# Patient Record
Sex: FEMALE | Race: WHITE | NOT HISPANIC OR LATINO | Employment: UNEMPLOYED | URBAN - METROPOLITAN AREA
[De-identification: names, ages, dates, MRNs, and addresses within clinical notes are randomized per-mention and may not be internally consistent; named-entity substitution may affect disease eponyms.]

---

## 2017-07-05 ENCOUNTER — OFFICE VISIT (OUTPATIENT)
Dept: URGENT CARE | Facility: CLINIC | Age: 37
End: 2017-07-05
Payer: COMMERCIAL

## 2017-07-05 VITALS
HEIGHT: 67 IN | HEART RATE: 80 BPM | DIASTOLIC BLOOD PRESSURE: 70 MMHG | RESPIRATION RATE: 20 BRPM | WEIGHT: 144.2 LBS | BODY MASS INDEX: 22.63 KG/M2 | OXYGEN SATURATION: 100 % | TEMPERATURE: 98.4 F | SYSTOLIC BLOOD PRESSURE: 102 MMHG

## 2017-07-05 DIAGNOSIS — J40 BRONCHITIS: ICD-10-CM

## 2017-07-05 PROCEDURE — 99203 OFFICE O/P NEW LOW 30 MIN: CPT | Performed by: PHYSICIAN ASSISTANT

## 2017-07-05 RX ORDER — PROMETHAZINE HYDROCHLORIDE AND CODEINE PHOSPHATE 6.25; 1 MG/5ML; MG/5ML
5-10 SYRUP ORAL 3 TIMES DAILY PRN
Qty: 150 ML | Refills: 0 | Status: SHIPPED | OUTPATIENT
Start: 2017-07-05 | End: 2017-07-20 | Stop reason: SDUPTHER

## 2017-07-05 RX ORDER — DOXYCYCLINE HYCLATE 100 MG
100 TABLET ORAL 2 TIMES DAILY
Qty: 20 TAB | Refills: 0 | Status: SHIPPED | OUTPATIENT
Start: 2017-07-05 | End: 2017-07-15

## 2017-07-05 RX ORDER — ALBUTEROL SULFATE 90 UG/1
2 AEROSOL, METERED RESPIRATORY (INHALATION) EVERY 4 HOURS PRN
Qty: 1 INHALER | Refills: 0 | Status: SHIPPED | OUTPATIENT
Start: 2017-07-05 | End: 2017-07-05

## 2017-07-05 RX ORDER — ALBUTEROL SULFATE 90 UG/1
2 AEROSOL, METERED RESPIRATORY (INHALATION) EVERY 4 HOURS PRN
Qty: 1 INHALER | Refills: 0 | Status: SHIPPED | OUTPATIENT
Start: 2017-07-05 | End: 2018-04-20

## 2017-07-05 ASSESSMENT — ENCOUNTER SYMPTOMS
CONSTITUTIONAL NEGATIVE: 1
SHORTNESS OF BREATH: 0
FEVER: 0
SORE THROAT: 0
CARDIOVASCULAR NEGATIVE: 1
COUGH: 1
EYES NEGATIVE: 1
SPUTUM PRODUCTION: 1

## 2017-07-05 NOTE — MR AVS SNAPSHOT
"        Leslee Mcnair   2017 5:45 PM   Office Visit   MRN: [id number]    Department:  [facility]   Dept Phone:  [phone]    Description:  Female : 1980   Provider:  Harley Flores PA-C           Reason for Visit     Cough cough, chest congestion, sinus infection, green thick phlegm, headache, nose, thraot, ears, patient does not want amoxicillin or zpak, patient wants doxycycline      Allergies as of 2017     Not on File      You were diagnosed with     Bronchitis   [[id number]]         Vital Signs     Blood Pressure Pulse Temperature Respirations Height Weight    102/70 mmHg 80 36.9 °C (98.4 °F) 20 1.702 m (5' 7\") 65.409 kg (144 lb 3.2 oz)    Body Mass Index Oxygen Saturation                22.58 kg/m2 100%          Basic Information     Date Of Birth Sex Race Ethnicity Preferred Language    1980 Female White Non- English      Health Maintenance     Patient has no pending health maintenance at this time      Current Immunizations     No immunizations on file.      Below and/or attached are the medications your provider expects you to take. Review all of your home medications and newly ordered medications with your provider and/or pharmacist. Follow medication instructions as directed by your provider and/or pharmacist. Please keep your medication list with you and share with your provider. Update the information when medications are discontinued, doses are changed, or new medications (including over-the-counter products) are added; and carry medication information at all times in the event of emergency situations     Allergies:  No Known Allergies          Medications  Valid as of: 2017 -  6:07 PM    Generic Name Brand Name Tablet Size Instructions for use    Albuterol Sulfate (Aero Soln) albuterol 108 (90 BASE) MCG/ACT Inhale 2 Puffs by mouth every four hours as needed for Shortness of Breath.        Doxycycline Hyclate (Tab) VIBRAMYCIN 100 MG Take 1 Tab by mouth 2 times a " day for 10 days.        Promethazine-Codeine (Syrup) PHENERGAN-CODEINE 6.25-10 MG/5ML Take 5-10 mL by mouth 3 times a day as needed for Cough (or use qhs).        .                 Medicines prescribed today were sent to:     [facility]/PHARMACY #[id number] - [place] - [address] [place] [address]    Phone: [phone] Fax: [phone]    Open 24 Hours?: No      Medication refill instructions:       If your prescription bottle indicates you have medication refills left, it is not necessary to call your provider’s office. Please contact your pharmacy and they will refill your medication.    If your prescription bottle indicates you do not have any refills left, you may request refills at any time through one of the following ways: The online TunePatrol system (except Urgent Care), by calling your provider’s office, or by asking your pharmacy to contact your provider’s office with a refill request. Medication refills are processed only during regular business hours and may not be available until the next business day. Your provider may request additional information or to have a follow-up visit with you prior to refilling your medication.   *Please Note: Medication refills are assigned a new Rx number when refilled electronically. Your pharmacy may indicate that no refills were authorized even though a new prescription for the same medication is available at the pharmacy. Please request the medicine by name with the pharmacy before contacting your provider for a refill.           TunePatrol Access Code: [email or web address]  Expires: 8/4/2017  6:07 PM    Your email address is not on file at [facility].  Email Addresses are required for you to sign up for TunePatrol, please contact [phone] to verify your personal information and to provide your email address prior to attempting to register for TunePatrol.    Leslee Mcnair  [address]  [place] [address]    TunePatrol  A secure, online tool to manage your health  information     [employer]’s TeachTown® is a secure, online tool that connects you to your personalized health information from the privacy of your home -- day or night - making it very easy for you to manage your healthcare. Once the activation process is completed, you can even access your medical information using the Try The Worldt anneliese, which is available for free in the Apple Anneliese store or Google Play store.     To learn more about TeachTown, visit [email or web address] [email or web address] [email or web address]    There are two levels of access available (as shown below):   My Chart Features  [facility] Primary Care Doctor [facility]  Specialists [facility]  Urgent  Care Non-[facility] Primary Care Doctor   Email your healthcare team securely and privately 24/7 X X X    Manage appointments: schedule your next appointment; view details of past/upcoming appointments X      Request prescription refills. X      View recent personal medical records, including lab and immunizations X X X X   View health record, including health history, allergies, medications X X X X   Read reports about your outpatient visits, procedures, consult and ER notes X X X X   See your discharge summary, which is a recap of your hospital and/or ER visit that includes your diagnosis, lab results, and care plan X X  X     How to register for TeachTown:  Once your e-mail address has been verified, follow the following steps to sign up for TeachTown.     1. Go to  [email or web address]  2. Click on the Sign Up Now box, which takes you to the New Member Sign Up page. You will need to provide the following information:  a. Enter your TeachTown Access Code exactly as it appears at the top of this page. (You will not need to use this code after you’ve completed the sign-up process. If you do not sign up before the expiration date, you must request a new code.)   b. Enter your date of birth.   c. Enter your home email address.   d. Click Submit, and follow the next screen’s instructions.  3. Create a TeachTown ID.  This will be your Milk login ID and cannot be changed, so think of one that is secure and easy to remember.  4. Create a Milk password. You can change your password at any time.  5. Enter your Password Reset Question and Answer. This can be used at a later time if you forget your password.   6. Enter your e-mail address. This allows you to receive e-mail notifications when new information is available in Milk.  7. Click Sign Up. You can now view your health information.    For assistance activating your Milk account, call [phone]

## 2017-07-06 NOTE — PROGRESS NOTES
"Subjective:      Leslee Mcnair is a 37 y.o. female who presents with Cough            Cough  This is a new problem. The current episode started in the past 7 days. The problem has been unchanged. The problem occurs every few minutes. The cough is productive of sputum. Associated symptoms include nasal congestion. Pertinent negatives include no fever, sore throat or shortness of breath. Nothing aggravates the symptoms. She has tried nothing for the symptoms. The treatment provided no relief. There is no history of asthma or environmental allergies.       Review of Systems   Constitutional: Negative.  Negative for fever.   HENT: Positive for congestion. Negative for sore throat.    Eyes: Negative.    Respiratory: Positive for cough and sputum production. Negative for shortness of breath.    Cardiovascular: Negative.    Skin: Negative.    Endo/Heme/Allergies: Negative for environmental allergies.          Objective:     /70 mmHg  Pulse 80  Temp(Src) 36.9 °C (98.4 °F)  Resp 20  Ht 1.702 m (5' 7\")  Wt 65.409 kg (144 lb 3.2 oz)  BMI 22.58 kg/m2  SpO2 100%     Physical Exam   Constitutional: She is oriented to person, place, and time. She appears well-developed and well-nourished. No distress.   HENT:   Head: Normocephalic and atraumatic.   Eyes: EOM are normal. Pupils are equal, round, and reactive to light.   Neck: Normal range of motion. Neck supple.   Cardiovascular: Normal rate.    Pulmonary/Chest: Effort normal and breath sounds normal. No respiratory distress. She has no wheezes. She has no rales.   Neurological: She is alert and oriented to person, place, and time.   Skin: Skin is warm and dry.   Psychiatric: She has a normal mood and affect. Her behavior is normal. Judgment and thought content normal.   Nursing note and vitals reviewed.    Filed Vitals:    07/05/17 1739   BP: 102/70   Pulse: 80   Temp: 36.9 °C (98.4 °F)   Resp: 20   Height: 1.702 m (5' 7\")   Weight: 65.409 kg (144 lb 3.2 oz)   SpO2: " 100%     Active Ambulatory Problems     Diagnosis Date Noted   • No Active Ambulatory Problems     Resolved Ambulatory Problems     Diagnosis Date Noted   • No Resolved Ambulatory Problems     No Additional Past Medical History     No current outpatient prescriptions on file prior to visit.     No current facility-administered medications on file prior to visit.     Gargles, Cepacol lozenges, Aleve/Advil as needed for throat pain  History reviewed. No pertinent family history.  Review of patient's allergies indicates not on file.             Assessment/Plan:     ·  NASOPHARYNGITIS  bronchitis      · meds as rx;

## 2017-07-15 ENCOUNTER — OFFICE VISIT (OUTPATIENT)
Dept: URGENT CARE | Facility: CLINIC | Age: 37
End: 2017-07-15
Payer: COMMERCIAL

## 2017-07-15 VITALS
DIASTOLIC BLOOD PRESSURE: 72 MMHG | WEIGHT: 144 LBS | HEART RATE: 78 BPM | SYSTOLIC BLOOD PRESSURE: 98 MMHG | HEIGHT: 67 IN | BODY MASS INDEX: 22.6 KG/M2 | OXYGEN SATURATION: 94 % | TEMPERATURE: 97.7 F

## 2017-07-15 DIAGNOSIS — R05.3 PERSISTENT COUGH: ICD-10-CM

## 2017-07-15 PROCEDURE — 99214 OFFICE O/P EST MOD 30 MIN: CPT | Performed by: PHYSICIAN ASSISTANT

## 2017-07-15 RX ORDER — LEVOFLOXACIN 500 MG/1
500 TABLET, FILM COATED ORAL DAILY
Qty: 10 TAB | Refills: 0 | Status: SHIPPED | OUTPATIENT
Start: 2017-07-15 | End: 2017-07-20

## 2017-07-15 ASSESSMENT — ENCOUNTER SYMPTOMS
SPUTUM PRODUCTION: 1
FEVER: 0
SHORTNESS OF BREATH: 0
COUGH: 1
EYES NEGATIVE: 1
SORE THROAT: 0
CARDIOVASCULAR NEGATIVE: 1
CONSTITUTIONAL NEGATIVE: 1

## 2017-07-15 NOTE — PROGRESS NOTES
"Subjective:      Leslee Mcnair is a 37 y.o. female who presents with Cough            Cough  This is a new problem. The current episode started in the past 7 days. The problem has been unchanged. The problem occurs every few minutes. The cough is productive of sputum. Pertinent negatives include no fever, nasal congestion, sore throat or shortness of breath. Nothing aggravates the symptoms. She has tried nothing for the symptoms. The treatment provided no relief. There is no history of asthma or environmental allergies.       Review of Systems   Constitutional: Negative.  Negative for fever.   HENT: Negative.  Negative for sore throat.    Eyes: Negative.    Respiratory: Positive for cough and sputum production. Negative for shortness of breath.    Cardiovascular: Negative.    Skin: Negative.    Endo/Heme/Allergies: Negative for environmental allergies.          Objective:     BP 98/72 mmHg  Pulse 78  Temp(Src) 36.5 °C (97.7 °F)  Ht 1.702 m (5' 7.01\")  Wt 65.318 kg (144 lb)  BMI 22.55 kg/m2  SpO2 94%     Physical Exam   Constitutional: She is oriented to person, place, and time. She appears well-developed and well-nourished. No distress.   HENT:   Head: Normocephalic and atraumatic.   Eyes: EOM are normal. Pupils are equal, round, and reactive to light.   Neck: Normal range of motion. Neck supple.   Cardiovascular: Normal rate.    Pulmonary/Chest: Effort normal and breath sounds normal. No respiratory distress. She has no wheezes. She has no rales.   Neurological: She is alert and oriented to person, place, and time.   Skin: Skin is warm and dry.   Psychiatric: She has a normal mood and affect. Her behavior is normal. Judgment and thought content normal.   Nursing note and vitals reviewed.    Filed Vitals:    07/15/17 1347   BP: 98/72   Pulse: 78   Temp: 36.5 °C (97.7 °F)   Height: 1.702 m (5' 7.01\")   Weight: 65.318 kg (144 lb)   SpO2: 94%     Active Ambulatory Problems     Diagnosis Date Noted   • No Active " Ambulatory Problems     Resolved Ambulatory Problems     Diagnosis Date Noted   • No Resolved Ambulatory Problems     No Additional Past Medical History     Current Outpatient Prescriptions on File Prior to Visit   Medication Sig Dispense Refill   • doxycycline (VIBRAMYCIN) 100 MG Tab Take 1 Tab by mouth 2 times a day for 10 days. 20 Tab 0   • albuterol 108 (90 BASE) MCG/ACT Aero Soln inhalation aerosol Inhale 2 Puffs by mouth every four hours as needed for Shortness of Breath. 1 Inhaler 0   • promethazine-codeine (PHENERGAN-CODEINE) 6.25-10 MG/5ML Syrup Take 5-10 mL by mouth 3 times a day as needed for Cough (or use qhs). 150 mL 0     No current facility-administered medications on file prior to visit.     Gargles, Cepacol lozenges, Aleve/Advil as needed for throat pain  History reviewed. No pertinent family history.  Review of patient's allergies indicates not on file.              Assessment/Plan:     ·  bronchitis, persistent      · Pt prev on zpak, doxy; some improvement but then recurring cough, worsening again; says in past has had to take doxy 30 days! At high dose; will try levaquin course  · F/u PCP; consider Pulmon.refer.[pt has L lung/chest wall trauma hx that may be related to suseptability to infections [by her hx]

## 2017-07-15 NOTE — MR AVS SNAPSHOT
"        Leslee Mcnair   7/15/2017 1:45 PM   Office Visit   MRN: [id number]    Department:  [facility]   Dept Phone:  [phone]    Description:  Female : 1980   Provider:  Harley Flores PA-C           Reason for Visit     Cough Seen , Productive cough, Loss of voice and appetite, ear pain, Sinus pain, nausea       Allergies as of 7/15/2017     Not on File      You were diagnosed with     Persistent cough   [129024]         Vital Signs     Blood Pressure Pulse Temperature Height Weight Body Mass Index    98/72 mmHg 78 36.5 °C (97.7 °F) 1.702 m (5' 7.01\") 65.318 kg (144 lb) 22.55 kg/m2    Oxygen Saturation                   94%           Basic Information     Date Of Birth Sex Race Ethnicity Preferred Language    1980 Female White Non- English      Your appointments     2017  3:20 PM   NEW TO YOU with Jessica Espinoza PA-C   [facility] (--)    [address]  [address]  [facility] [facility] [id number] [phone]              Health Maintenance        Date Due Completion Dates    IMM DTaP/Tdap/Td Vaccine (1 - Tdap) 2000 ---    PAP SMEAR 2002 ---    IMM INFLUENZA (1) 2017 ---            Current Immunizations     No immunizations on file.      Below and/or attached are the medications your provider expects you to take. Review all of your home medications and newly ordered medications with your provider and/or pharmacist. Follow medication instructions as directed by your provider and/or pharmacist. Please keep your medication list with you and share with your provider. Update the information when medications are discontinued, doses are changed, or new medications (including over-the-counter products) are added; and carry medication information at all times in the event of emergency situations     Allergies:  No Known Allergies          Medications  Valid as of: July 15, 2017 -  2:10 PM    Generic Name Brand Name Tablet Size Instructions for use   " Albuterol Sulfate (Aero Soln) albuterol 108 (90 BASE) MCG/ACT Inhale 2 Puffs by mouth every four hours as needed for Shortness of Breath.        Doxycycline Hyclate (Tab) VIBRAMYCIN 100 MG Take 1 Tab by mouth 2 times a day for 10 days.        Promethazine-Codeine (Syrup) PHENERGAN-CODEINE 6.25-10 MG/5ML Take 5-10 mL by mouth 3 times a day as needed for Cough (or use qhs).        .                 Medicines prescribed today were sent to:     [facility]/PHARMACY #[id number] - [place], [place] - [address] [place] [address]    Phone: [phone] Fax: [phone]    Open 24 Hours?: No      Medication refill instructions:       If your prescription bottle indicates you have medication refills left, it is not necessary to call your provider’s office. Please contact your pharmacy and they will refill your medication.    If your prescription bottle indicates you do not have any refills left, you may request refills at any time through one of the following ways: The online [employer] system (except Urgent Care), by calling your provider’s office, or by asking your pharmacy to contact your provider’s office with a refill request. Medication refills are processed only during regular business hours and may not be available until the next business day. Your provider may request additional information or to have a follow-up visit with you prior to refilling your medication.   *Please Note: Medication refills are assigned a new Rx number when refilled electronically. Your pharmacy may indicate that no refills were authorized even though a new prescription for the same medication is available at the pharmacy. Please request the medicine by name with the pharmacy before contacting your provider for a refill.           [employer] Access Code: [email or web address]  Expires: 8/4/2017  6:07 PM    Your email address is not on file at [facility].  Email Addresses are required for you to sign up for [employer], please contact [phone] to verify your  personal information and to provide your email address prior to attempting to register for [employer].    Leslee Mcnair  [address] [place] [address]    eZWay  A secure, online tool to manage your health information     [employer]’s eZWay® is a secure, online tool that connects you to your personalized health information from the privacy of your home -- day or night - making it very easy for you to manage your healthcare. Once the activation process is completed, you can even access your medical information using the eZWay anneliese, which is available for free in the Apple Anneliese store or Google Play store.     To learn more about eZWay, visit [email or web address]/eZWay    There are two levels of access available (as shown below):   My Chart Features  [facility] Primary Care Doctor [facility]  Specialists [facility]  Urgent  Care Non-[facility] Primary Care Doctor   Email your healthcare team securely and privately 24/7 X X X    Manage appointments: schedule your next appointment; view details of past/upcoming appointments X      Request prescription refills. X      View recent personal medical records, including lab and immunizations X X X X   View health record, including health history, allergies, medications X X X X   Read reports about your outpatient visits, procedures, consult and ER notes X X X X   See your discharge summary, which is a recap of your hospital and/or ER visit that includes your diagnosis, lab results, and care plan X X  X     How to register for [employer]:  Once your e-mail address has been verified, follow the following steps to sign up for [employer].     1. Go to  [email or web address]  2. Click on the Sign Up Now box, which takes you to the New Member Sign Up page. You will need to provide the following information:  a. Enter your eZWay Access Code exactly as it appears at the top of this page. (You will not need to use this code after you’ve completed the sign-up process. If you do not sign up  before the expiration date, you must request a new code.)   b. Enter your date of birth.   c. Enter your home email address.   d. Click Submit, and follow the next screen’s instructions.  3. Create a Smart Destinationst ID. This will be your Smart Destinationst login ID and cannot be changed, so think of one that is secure and easy to remember.  4. Create a Smart Destinationst password. You can change your password at any time.  5. Enter your Password Reset Question and Answer. This can be used at a later time if you forget your password.   6. Enter your e-mail address. This allows you to receive e-mail notifications when new information is available in AppEnsure.  7. Click Sign Up. You can now view your health information.    For assistance activating your AppEnsure account, call [phone]

## 2017-07-20 ENCOUNTER — OFFICE VISIT (OUTPATIENT)
Dept: MEDICAL GROUP | Facility: MEDICAL CENTER | Age: 37
End: 2017-07-20
Payer: COMMERCIAL

## 2017-07-20 VITALS
DIASTOLIC BLOOD PRESSURE: 64 MMHG | OXYGEN SATURATION: 97 % | HEIGHT: 67 IN | RESPIRATION RATE: 16 BRPM | WEIGHT: 143.4 LBS | SYSTOLIC BLOOD PRESSURE: 120 MMHG | BODY MASS INDEX: 22.51 KG/M2 | TEMPERATURE: 99.5 F | HEART RATE: 122 BPM

## 2017-07-20 DIAGNOSIS — R05.3 CHRONIC COUGH: ICD-10-CM

## 2017-07-20 DIAGNOSIS — R05.9 COUGH: ICD-10-CM

## 2017-07-20 DIAGNOSIS — J40 BRONCHITIS: ICD-10-CM

## 2017-07-20 PROCEDURE — 99214 OFFICE O/P EST MOD 30 MIN: CPT | Performed by: PHYSICIAN ASSISTANT

## 2017-07-20 RX ORDER — PROMETHAZINE HYDROCHLORIDE AND CODEINE PHOSPHATE 6.25; 1 MG/5ML; MG/5ML
5-10 SYRUP ORAL 3 TIMES DAILY PRN
Qty: 150 ML | Refills: 0 | Status: SHIPPED | OUTPATIENT
Start: 2017-07-20 | End: 2018-04-20

## 2017-07-20 RX ORDER — DOXYCYCLINE HYCLATE 200 MG/1
1 TABLET, DELAYED RELEASE ORAL DAILY
Qty: 30 TAB | Refills: 0 | Status: SHIPPED | OUTPATIENT
Start: 2017-07-20 | End: 2017-08-19

## 2017-07-20 ASSESSMENT — PATIENT HEALTH QUESTIONNAIRE - PHQ9: CLINICAL INTERPRETATION OF PHQ2 SCORE: 0

## 2017-07-20 NOTE — ASSESSMENT & PLAN NOTE
"Per patient chronic and recurrent problem. States she has to take doxycycline \"highest dose\" 300-400mg per day for a month to get better. This has been \"years\". Had a doctor in [place]. Had \"scans\". 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 She states she can taste the infection. She states she is not contagious to anyone else. Gets in a few times a year. Prevents her from working when it happens. States that at one time a different doctor gave her amoxicillin and then zpack and she just got worse - says she threatened to go to the hospital and demanded to get doxycycline and then she finally got better. Doesn't have any records with her. No fever/chills. No known immune deficiency. No other lung problem - no COPD, asthma, RAD, TB. Went to urgent care on the 5th and 15th of the months. Initially given 10 days of doxycycline and was starting to feel better. Then went back and was given levaquin but she states she can't tolerate it. Stomach pain, cramping, diarrhea. Doesn't want to take any more.  "

## 2017-07-20 NOTE — MR AVS SNAPSHOT
"        Leslee Mcnair   2017 3:20 PM   Office Visit   MRN: [id number]    Department:  [facility]   Dept Phone:  [phone]    Description:  Female : 1980   Provider:  Jessica Espinoza PA-C           Reason for Visit     Establish Care     Other dx: lung infection x 1 mo, chronic x last few years, usually only takes doxycycline    Medication Problem Levaquin causing nausea, and abdominal pain, painful bowel movements    Other Chronic lung infections, feels like a pocket of fluid is collecting in lungs, concave area in left lung    Cough Coughing up mucus, green    Other Reactions to a lot of antibiotics      Allergies as of 2017     No Known Allergies      You were diagnosed with     Bronchitis   [236766]       Cough   [786.2.ICD-9-CM]       Chronic cough   [683505]         Vital Signs     Blood Pressure Pulse Temperature Respirations Height Weight    120/64 mmHg 122 37.5 °C (99.5 °F) 16 1.702 m (5' 7.01\") 65.046 kg (143 lb 6.4 oz)    Body Mass Index Oxygen Saturation Last Menstrual Period Smoking Status          22.45 kg/m2 97% 2017 Never Smoker         Basic Information     Date Of Birth Sex Race Ethnicity Preferred Language    1980 Female White Non- English      Problem List              ICD-10-CM Priority Class Noted - Resolved    Cough R05   2017 - Present      Health Maintenance        Date Due Completion Dates    IMM DTaP/Tdap/Td Vaccine (1 - Tdap) 2000 ---    PAP SMEAR 2002 ---    IMM INFLUENZA (1) 2017 ---            Current Immunizations     No immunizations on file.      Below and/or attached are the medications your provider expects you to take. Review all of your home medications and newly ordered medications with your provider and/or pharmacist. Follow medication instructions as directed by your provider and/or pharmacist. Please keep your medication list with you and share with your provider. Update the information when medications are " discontinued, doses are changed, or new medications (including over-the-counter products) are added; and carry medication information at all times in the event of emergency situations     Allergies:  No Known Allergies          Medications  Valid as of: July 20, 2017 -  4:36 PM    Generic Name Brand Name Tablet Size Instructions for use    Albuterol Sulfate (Aero Soln) albuterol 108 (90 BASE) MCG/ACT Inhale 2 Puffs by mouth every four hours as needed for Shortness of Breath.        Doxycycline Hyclate (Tablet Delayed Response) Doxycycline Hyclate 200 MG Take 1 Tab by mouth every day for 30 days.        Promethazine-Codeine (Syrup) PHENERGAN-CODEINE 6.25-10 MG/5ML Take 5-10 mL by mouth 3 times a day as needed for Cough (or use qhs).        .                 Medicines prescribed today were sent to:     [facility][id number] - [place], [place] - [address] [place] [address]    Phone: [phone] Fax: [phone]    Open 24 Hours?: No      Medication refill instructions:       If your prescription bottle indicates you have medication refills left, it is not necessary to call your provider’s office. Please contact your pharmacy and they will refill your medication.    If your prescription bottle indicates you do not have any refills left, you may request refills at any time through one of the following ways: The online [employer] system (except Urgent Care), by calling your provider’s office, or by asking your pharmacy to contact your provider’s office with a refill request. Medication refills are processed only during regular business hours and may not be available until the next business day. Your provider may request additional information or to have a follow-up visit with you prior to refilling your medication.   *Please Note: Medication refills are assigned a new Rx number when refilled electronically. Your pharmacy may indicate that no refills were authorized even though a new prescription for the same medication  is available at the pharmacy. Please request the medicine by name with the pharmacy before contacting your provider for a refill.        Referral     A referral request has been sent to our patient care coordination department. Please allow 3-5 business days for us to process this request and contact you either by phone or mail. If you do not hear from us by the 5th business day, please call us at [phone].           MarketMuse Access Code: [email or web address]  Expires: 8/4/2017  6:07 PM    MarketMuse  A secure, online tool to manage your health information     [employer]’s MarketMuse® is a secure, online tool that connects you to your personalized health information from the privacy of your home -- day or night - making it very easy for you to manage your healthcare. Once the activation process is completed, you can even access your medical information using the MarketMuse anneliese, which is available for free in the Apple Anneliese store or Google Play store.     MarketMuse provides the following levels of access (as shown below):   My Chart Features   [facility] Doctor [facility]  Specialists [facility]  Urgent  Care [facility]  Primary Care  Doctor   Email your healthcare team securely and privately 24/7 X X X    Manage appointments: schedule your next appointment; view details of past/upcoming appointments X      Request prescription refills. X      View recent personal medical records, including lab and immunizations X X X X   View health record, including health history, allergies, medications X X X X   Read reports about your outpatient visits, procedures, consult and ER notes X X X X   See your discharge summary, which is a recap of your hospital and/or ER visit that includes your diagnosis, lab results, and care plan. X X       How to register for MarketMuse:  1. Go to  [email or web address].  2. Click on the Sign Up Now box, which takes you to the New Member Sign Up page. You will need to provide the following  information:  a. Enter your Carsabi Access Code exactly as it appears at the top of this page. (You will not need to use this code after you’ve completed the sign-up process. If you do not sign up before the expiration date, you must request a new code.)   b. Enter your date of birth.   c. Enter your home email address.   d. Click Submit, and follow the next screen’s instructions.  3. Create a Carsabi ID. This will be your Carsabi login ID and cannot be changed, so think of one that is secure and easy to remember.  4. Create a Carsabi password. You can change your password at any time.  5. Enter your Password Reset Question and Answer. This can be used at a later time if you forget your password.   6. Enter your e-mail address. This allows you to receive e-mail notifications when new information is available in Carsabi.  7. Click Sign Up. You can now view your health information.    For assistance activating your Carsabi account, call [phone]

## 2017-07-21 DIAGNOSIS — R05.3 CHRONIC COUGH: ICD-10-CM

## 2017-07-21 DIAGNOSIS — R05.9 COUGH: ICD-10-CM

## 2017-07-21 RX ORDER — DOXYCYCLINE HYCLATE 200 MG/1
1 TABLET, DELAYED RELEASE ORAL DAILY
Qty: 30 TAB | Refills: 0 | Status: CANCELLED | OUTPATIENT
Start: 2017-07-21 | End: 2017-08-20

## 2017-07-21 NOTE — PROGRESS NOTES
"Chief Complaint   Patient presents with   • Establish Care   • Other     dx: lung infection x 1 mo, chronic x last few years, usually only takes doxycycline   • Medication Problem     Levaquin causing nausea, and abdominal pain, painful bowel movements   • Other     Chronic lung infections, feels like a pocket of fluid is collecting in lungs, concave area in left lung   • Cough     Coughing up mucus, green   • Other     Reactions to a lot of antibiotics       HISTORY OF THE PRESENT ILLNESS: This is a 37 y.o. female new patient to our practice. This to discuss a lung problem. She speaks very rapidly and repeats herself often. Very anxious and insistent on getting doxycycline.     Cough  Per patient chronic and recurrent problem. States she has to take doxycycline \"highest dose\" 300-400mg per day for a month to get better. This has been \"years\". Had a doctor in [place]. Had \"scans\". 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 She states she can taste the infection. She states she is not contagious to anyone else. Gets in a few times a year. Prevents her from working when it happens. States that at one time a different doctor gave her amoxicillin and then zpack and she just got worse - says she threatened to go to the hospital and demanded to get doxycycline and then she finally got better. Doesn't have any records with her. No fever/chills. No known immune deficiency. No other lung problem - no COPD, asthma, RAD, TB. Went to urgent care on the 5th and 15th of the months. Initially given 10 days of doxycycline and was starting to feel better. Then went back and was given levaquin but she states she can't tolerate it. Stomach pain, cramping, diarrhea. Doesn't want to take any more.        Past Medical History   Diagnosis Date   • Lung abnormality        Past Surgical History   Procedure Laterality Date   • Knee reconstruction         No family status " "information on file.   No family history on file.    Social History   Substance Use Topics   • Smoking status: Never Smoker    • Smokeless tobacco: Never Used   • Alcohol Use: 0.0 oz/week     0 Standard drinks or equivalent per week       Allergies: Levaquin    Current Outpatient Prescriptions Ordered in [facility]   Medication Sig Dispense Refill   • promethazine-codeine (PHENERGAN-CODEINE) 6.25-10 MG/5ML Syrup Take 5-10 mL by mouth 3 times a day as needed for Cough (or use qhs). 150 mL 0   • Doxycycline Hyclate 200 MG Tablet Delayed Response Take 1 Tab by mouth every day for 30 days. 30 Tab 0   • albuterol 108 (90 BASE) MCG/ACT Aero Soln inhalation aerosol Inhale 2 Puffs by mouth every four hours as needed for Shortness of Breath. 1 Inhaler 0     No current Epic-ordered facility-administered medications on file.       Review of Systems   Constitutional: Negative for fever, chills, weight loss and malaise/fatigue.   HENT: Negative for ear pain, nosebleeds, congestion, sore throat and neck pain.    Eyes: Negative for blurred vision.   Respiratory: Negative for shortness of breath and wheezing.    Cardiovascular: Negative for chest pain, palpitations, orthopnea and leg swelling.   Genitourinary: Negative for dysuria, urgency and frequency.   Musculoskeletal: Negative for myalgias, back pain and joint pain.   Skin: Negative for rash and itching.   Neurological: Negative for dizziness, tingling, tremors, sensory change, focal weakness and headaches.   Endo/Heme/Allergies: Does not bruise/bleed easily.   Psychiatric/Behavioral: Negative for depression, anxiety, or memory loss.     All other systems reviewed and are negative except as in HPI.    Exam: Blood pressure 120/64, pulse 122, temperature 37.5 °C (99.5 °F), resp. rate 16, height 1.702 m (5' 7.01\"), weight 65.046 kg (143 lb 6.4 oz), last menstrual period 06/28/2017, SpO2 97 %.  General: Normal appearing. No distress.  Neck: Supple without JVD or bruit. Thyroid is not " "enlarged.  Pulmonary: Clear to ausculation.  Normal effort. No rales, ronchi, or wheezing.  Cardiovascular: Regular rate and rhythm without murmur. Carotid and radial pulses are intact and equal bilaterally.  Neurologic: Grossly nonfocal  Skin: Warm and dry.  No obvious lesions.  Musculoskeletal: Normal gait. No extremity cyanosis, clubbing, or edema.  Psych: anxious, rapid speech. Difficult to have conversation because my questions were answered with repeating the same story about her doctor, her left chest \"concavity\" and her need for doxycycline. Alert and oriented x3. Judgment and insight is normal.  Assessment/Plan  1. Bronchitis     2. Cough  promethazine-codeine (PHENERGAN-CODEINE) 6.25-10 MG/5ML Syrup    Doxycycline Hyclate 200 MG Tablet Delayed Response   3. Chronic cough  REFERRAL TO PULMONOLOGY    Doxycycline Hyclate 200 MG Tablet Delayed Response     I am truly unclear on this patient's diagnosis. Hopefully we can obtain past records including any previous scans. Agreed to 200mg doxycycline daily but discussed at length possible complications from the medication. Strongly advise she see pulmonology for full evaluation with true diagnosis of the cause of her recurrent cough.   "

## 2018-01-15 ENCOUNTER — OFFICE VISIT (OUTPATIENT)
Dept: PULMONOLOGY | Facility: HOSPICE | Age: 38
End: 2018-01-15
Payer: COMMERCIAL

## 2018-01-15 VITALS
HEIGHT: 67 IN | TEMPERATURE: 97.9 F | RESPIRATION RATE: 15 BRPM | BODY MASS INDEX: 21.03 KG/M2 | WEIGHT: 134 LBS | DIASTOLIC BLOOD PRESSURE: 62 MMHG | OXYGEN SATURATION: 99 % | SYSTOLIC BLOOD PRESSURE: 108 MMHG | HEART RATE: 74 BPM

## 2018-01-15 DIAGNOSIS — R07.9 CHEST PAIN OF UNCERTAIN ETIOLOGY: ICD-10-CM

## 2018-01-15 DIAGNOSIS — J40 BRONCHITIS: ICD-10-CM

## 2018-01-15 PROCEDURE — 99244 OFF/OP CNSLTJ NEW/EST MOD 40: CPT | Performed by: INTERNAL MEDICINE

## 2018-01-15 RX ORDER — CLONAZEPAM 1 MG/1
TABLET ORAL
COMMUNITY
Start: 2017-11-17 | End: 2018-04-20

## 2018-01-15 RX ORDER — CLONAZEPAM 2 MG/1
TABLET ORAL
COMMUNITY
Start: 2017-12-15 | End: 2020-04-28

## 2018-01-15 RX ORDER — PRAZOSIN HYDROCHLORIDE 2 MG/1
CAPSULE ORAL
COMMUNITY
Start: 2018-01-07 | End: 2020-04-28

## 2018-01-15 NOTE — PATIENT INSTRUCTIONS
Her chest pain is of uncertain etiology  We will see her back in one month with pulmonary function tests to look at the possibility of underlying asthma  At that time she will also bring a disc that contains her prior CT scans  We will contact Anabella Espinoza to see if she has any additional information concerning the patient's original evaluation in the [place] area
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
## 2018-02-08 ENCOUNTER — TELEPHONE (OUTPATIENT)
Dept: PULMONOLOGY | Facility: HOSPICE | Age: 38
End: 2018-02-08

## 2018-02-08 ENCOUNTER — HOSPITAL ENCOUNTER (OUTPATIENT)
Dept: LAB | Facility: MEDICAL CENTER | Age: 38
End: 2018-02-08
Attending: PSYCHIATRY & NEUROLOGY
Payer: COMMERCIAL

## 2018-02-08 LAB
APPEARANCE UR: CLEAR
BILIRUB UR QL STRIP.AUTO: NEGATIVE
COLOR UR: YELLOW
GLUCOSE UR STRIP.AUTO-MCNC: NEGATIVE MG/DL
KETONES UR STRIP.AUTO-MCNC: NEGATIVE MG/DL
LEUKOCYTE ESTERASE UR QL STRIP.AUTO: NEGATIVE
MICRO URNS: NORMAL
NITRITE UR QL STRIP.AUTO: NEGATIVE
PH UR STRIP.AUTO: 6.5 [PH]
PROT UR QL STRIP: NEGATIVE MG/DL
RBC UR QL AUTO: NEGATIVE
SP GR UR STRIP.AUTO: 1.01
UROBILINOGEN UR STRIP.AUTO-MCNC: 0.2 MG/DL

## 2018-02-08 PROCEDURE — 80307 DRUG TEST PRSMV CHEM ANLYZR: CPT

## 2018-02-08 PROCEDURE — 81003 URINALYSIS AUTO W/O SCOPE: CPT

## 2018-02-08 NOTE — TELEPHONE ENCOUNTER
I tried to contact the patient to see who her previous provider was in [place], [place] per  last office notes. Had to leave a voicemail.

## 2018-02-13 LAB
AMPHETAMINES UR QL: NEGATIVE NG/ML
BARBITURATES UR QL: NEGATIVE NG/ML
BENZODIAZ UR QL: NEGATIVE NG/ML
CANNABINOIDS UR QL SCN: POSITIVE NG/ML
COCAINE UR QL: NEGATIVE NG/ML
DRUG SCREEN COMMENT UR-IMP: NORMAL
MDMA CTO UR SCN-MCNC: NEGATIVE NG/ML
METHADONE UR QL: NEGATIVE NG/ML
OPIATES UR QL: NEGATIVE NG/ML
OXYCODONE CTO UR SCN-MCNC: NEGATIVE NG/ML
PCP UR QL SCN: NEGATIVE NG/ML
PROPOXYPH UR QL: NEGATIVE NG/ML

## 2018-02-15 ENCOUNTER — APPOINTMENT (OUTPATIENT)
Dept: PULMONOLOGY | Facility: HOSPICE | Age: 38
End: 2018-02-15
Payer: COMMERCIAL

## 2018-04-20 ENCOUNTER — APPOINTMENT (OUTPATIENT)
Dept: RADIOLOGY | Facility: IMAGING CENTER | Age: 38
End: 2018-04-20
Attending: NURSE PRACTITIONER
Payer: COMMERCIAL

## 2018-04-20 ENCOUNTER — OFFICE VISIT (OUTPATIENT)
Dept: URGENT CARE | Facility: CLINIC | Age: 38
End: 2018-04-20
Payer: COMMERCIAL

## 2018-04-20 VITALS
WEIGHT: 134 LBS | DIASTOLIC BLOOD PRESSURE: 62 MMHG | RESPIRATION RATE: 16 BRPM | OXYGEN SATURATION: 97 % | HEART RATE: 86 BPM | BODY MASS INDEX: 21.03 KG/M2 | TEMPERATURE: 97.8 F | SYSTOLIC BLOOD PRESSURE: 100 MMHG | HEIGHT: 67 IN

## 2018-04-20 DIAGNOSIS — T14.8XXA TRAUMATIC ECCHYMOSIS OF MULTIPLE SITES: ICD-10-CM

## 2018-04-20 DIAGNOSIS — M54.2 ACUTE NECK PAIN: ICD-10-CM

## 2018-04-20 DIAGNOSIS — R07.89 ACUTE CHEST WALL PAIN: ICD-10-CM

## 2018-04-20 DIAGNOSIS — M54.6 ACUTE MIDLINE THORACIC BACK PAIN: ICD-10-CM

## 2018-04-20 DIAGNOSIS — S46.912A MUSCLE STRAIN OF LEFT UPPER ARM, INITIAL ENCOUNTER: ICD-10-CM
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
## 2018-04-20 DIAGNOSIS — M54.50 ACUTE MIDLINE LOW BACK PAIN WITHOUT SCIATICA: ICD-10-CM

## 2018-04-20 DIAGNOSIS — M79.622 LEFT UPPER ARM PAIN: ICD-10-CM

## 2018-04-20 PROCEDURE — 71046 X-RAY EXAM CHEST 2 VIEWS: CPT | Mod: TC | Performed by: NURSE PRACTITIONER

## 2018-04-20 PROCEDURE — 73060 X-RAY EXAM OF HUMERUS: CPT | Mod: TC,LT | Performed by: NURSE PRACTITIONER

## 2018-04-20 PROCEDURE — 99213 OFFICE O/P EST LOW 20 MIN: CPT | Performed by: NURSE PRACTITIONER
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
## 2018-04-21 ENCOUNTER — TELEPHONE (OUTPATIENT)
Dept: URGENT CARE | Facility: PHYSICIAN GROUP | Age: 38
End: 2018-04-21

## 2018-04-21 NOTE — TELEPHONE ENCOUNTER
The patient was called for re-evaluation twice today regarding , a message was left, encouraged to call back to the clinic or return to clinic with any questions or concerns.

## 2018-04-22 NOTE — TELEPHONE ENCOUNTER
Addendum to last telephone note: The patient was called for re-evaluation twice today for re-evaluation. CT scans were ordered yesterday but patient did not have performed. A message was left, instructing the patient to return the call to discuss.

## 2018-04-25 ASSESSMENT — ENCOUNTER SYMPTOMS
PALPITATIONS: 0
FOCAL WEAKNESS: 0
EYES NEGATIVE: 1
GASTROINTESTINAL NEGATIVE: 1
NEUROLOGICAL NEGATIVE: 1
COUGH: 0
NERVOUS/ANXIOUS: 1
ABDOMINAL PAIN: 0
CONSTITUTIONAL NEGATIVE: 1
BACK PAIN: 1
WHEEZING: 0
NAUSEA: 0
DIZZINESS: 0
LOSS OF CONSCIOUSNESS: 0
SPUTUM PRODUCTION: 0
VOMITING: 0
FALLS: 1
ROS SKIN COMMENTS: SCATTERED BRUISING
SHORTNESS OF BREATH: 1
HEADACHES: 0
HEMOPTYSIS: 0
NECK PAIN: 1

## 2018-04-25 ASSESSMENT — LIFESTYLE VARIABLES: SUBSTANCE_ABUSE: 0

## 2020-04-24 ENCOUNTER — TELEPHONE (OUTPATIENT)
Dept: SCHEDULING | Facility: IMAGING CENTER | Age: 40
End: 2020-04-24

## 2020-04-28 ENCOUNTER — OFFICE VISIT (OUTPATIENT)
Dept: MEDICAL GROUP | Facility: MEDICAL CENTER | Age: 40
End: 2020-04-28
Payer: COMMERCIAL

## 2020-04-28 VITALS
HEIGHT: 67 IN | TEMPERATURE: 97.9 F | BODY MASS INDEX: 22.44 KG/M2 | WEIGHT: 143 LBS | DIASTOLIC BLOOD PRESSURE: 80 MMHG | HEART RATE: 65 BPM | OXYGEN SATURATION: 99 % | SYSTOLIC BLOOD PRESSURE: 110 MMHG

## 2020-04-28 DIAGNOSIS — G89.29 CHRONIC NECK PAIN: ICD-10-CM

## 2020-04-28 DIAGNOSIS — F43.10 PTSD (POST-TRAUMATIC STRESS DISORDER): ICD-10-CM

## 2020-04-28 DIAGNOSIS — G60.9 IDIOPATHIC PERIPHERAL NEUROPATHY: ICD-10-CM

## 2020-04-28 DIAGNOSIS — M54.2 CHRONIC NECK PAIN: ICD-10-CM

## 2020-04-28 DIAGNOSIS — M25.511 CHRONIC RIGHT SHOULDER PAIN: ICD-10-CM

## 2020-04-28 DIAGNOSIS — Z78.9 VEGETARIAN: ICD-10-CM

## 2020-04-28 DIAGNOSIS — G89.29 CHRONIC RIGHT SHOULDER PAIN: ICD-10-CM

## 2020-04-28 DIAGNOSIS — Z00.00 WELL ADULT EXAM: ICD-10-CM

## 2020-04-28 PROBLEM — R05.9 COUGH: Status: RESOLVED | Noted: 2017-07-20 | Resolved: 2020-04-28

## 2020-04-28 PROCEDURE — 99204 OFFICE O/P NEW MOD 45 MIN: CPT | Performed by: INTERNAL MEDICINE

## 2020-04-28 RX ORDER — GABAPENTIN 100 MG/1
100 CAPSULE ORAL 3 TIMES DAILY
COMMUNITY
Start: 2020-04-02

## 2020-04-28 RX ORDER — FLUOXETINE HYDROCHLORIDE 20 MG/1
CAPSULE ORAL DAILY
COMMUNITY
Start: 2020-04-02

## 2020-04-28 RX ORDER — CLONAZEPAM 1 MG/1
1 TABLET ORAL
COMMUNITY
Start: 2020-04-10

## 2020-04-28 ASSESSMENT — PATIENT HEALTH QUESTIONNAIRE - PHQ9: CLINICAL INTERPRETATION OF PHQ2 SCORE: 0

## 2020-04-28 NOTE — ASSESSMENT & PLAN NOTE
X since August 2019 during the move back to [place] from [place].   It got worse over time. Constant pain, day and night with use, any type of physical exertion on right side.   Pain in right lateral neck, Radiates up to jaw and down to clavicle. Pain invoking headache, like banding around and squeezing sensation on the head.   Tried chiropractor. Dr. Diogenes Steele did EMG and trigger point inj which helped somewhat. PT x 6 weeks did not help with pain, helped with headaches.  Asso: tingling, numbness in hand, all fingers in right side.  Lost ability to play tennis, even doing dish.

## 2020-04-28 NOTE — PROGRESS NOTES
New Patient to Establish      CC: Neck pain, shoulder pain, concern for cancer    HPI:   40 y.o. female came into clinic for above.      Chronic neck pain  Chronic right shoulder pain  Idiopathic peripheral neuropathy  X since August 2019 during the move back to [place] from [place].   It got worse over time. Constant pain, day and night with use, any type of physical exertion on right side.   Pain in right lateral neck, Radiates up to jaw and down to clavicle and right shoulder. Pain invoking headache, like banding around and squeezing sensation on the head.   Tried chiropractor.  Currently seeing physiatry, Dr. Diogenes Steele did EMG and trigger point inj which helped somewhat for pain in upper thoracic and shoulder blade. PT x 6 weeks did not help with pain, helped with headaches.   Asso: tingling, numbness in hand, all fingers in right side. Gabapentin prescribed for PTSD is helping with neuropathy.   She is right handed. She lost ability to play tennis, even doing dishes.   Her father-in-law is 91 yo retired cardiothoracic surgeon who is concerned about her shoulder pain and possibility of apical lung malignancy.    PTSD (post-traumatic stress disorder)  Seeing psych, Nolan Cleary MD. she is in the process of getting a therapist.  She is currently on clonazepam 1 mg nightly for sleep.  Psychiatry is planning to taper off using Prozac and gabapentin.    ROS:   Denies any fever, night sweats, weight loss, appetite loss.  She gained about 15 pounds because of inactivity.  10 systems reviewed, negative except mentioned as above.        Patient Active Problem List    Diagnosis Date Noted   • PTSD (post-traumatic stress disorder) 04/28/2020   • Chronic neck pain 04/28/2020   • Vegetarian 04/28/2020   • Idiopathic peripheral neuropathy 04/28/2020   • Chronic right shoulder pain 04/28/2020       Past Medical History:   Diagnosis Date   • Anxiety    • Chickenpox    • Influenza    • Lung abnormality    • Myoma    • PTSD  (post-traumatic stress disorder)        Current Outpatient Medications   Medication Sig Dispense Refill   • clonazePAM (KLONOPIN) 1 MG Tab 1 mg every bedtime.     • FLUoxetine (PROZAC) 20 MG Cap Take  by mouth every day. Take 1 capsule by mouth every day     • gabapentin (NEURONTIN) 100 MG Cap Take 100 mg by mouth 3 times a day. Take 1 capsule by mouth three times a day       No current facility-administered medications for this visit.        Allergies as of 04/28/2020 - Reviewed 04/28/2020   Allergen Reaction Noted   • Levaquin  07/20/2017       Social History     Socioeconomic History   • Marital status:      Spouse name: Not on file   • Number of children: Not on file   • Years of education: Not on file   • Highest education level: Not on file   Occupational History   • Not on file   Social Needs   • Financial resource strain: Not on file   • Food insecurity     Worry: Not on file     Inability: Not on file   • Transportation needs     Medical: Not on file     Non-medical: Not on file   Tobacco Use   • Smoking status: Never Smoker   • Smokeless tobacco: Never Used   Substance and Sexual Activity   • Alcohol use: Not Currently     Alcohol/week: 0.0 oz   • Drug use: No   • Sexual activity: Yes     Comment: .    Lifestyle   • Physical activity     Days per week: Not on file     Minutes per session: Not on file   • Stress: Not on file   Relationships   • Social connections     Talks on phone: Not on file     Gets together: Not on file     Attends Taoist service: Not on file     Active member of club or organization: Not on file     Attends meetings of clubs or organizations: Not on file     Relationship status: Not on file   • Intimate partner violence     Fear of current or ex partner: Not on file     Emotionally abused: Not on file     Physically abused: Not on file     Forced sexual activity: Not on file   Other Topics Concern   • Not on file   Social History Narrative   • Not on file       Family  "History   Problem Relation Age of Onset   • No Known Problems Father    • No Known Problems Sister    • No Known Problems Brother        Past Surgical History:   Procedure Laterality Date   • KNEE RECONSTRUCTION Left 2010         /80 (BP Location: Right arm, Patient Position: Sitting, BP Cuff Size: Adult)   Pulse 65   Temp 36.6 °C (97.9 °F) (Temporal)   Ht 1.702 m (5' 7\")   Wt 64.9 kg (143 lb)   SpO2 99%   BMI 22.40 kg/m²     Physical Exam  General: Alert and oriented, No apparent distress.  Eyes: Pupils are equal and reactive. No scleral icterus.  Throat: Clear no erythema or exudates noted.  Neck: Supple. No cervical or supraclavicular lymphadenopathy noted. Thyroid not enlarged.  Lungs: Clear to auscultation bilaterally without any wheezing, crepitations.  Cardiovascular: Regular rate and rhythm. No murmurs, rubs or gallops.  Abdomen: Bowel sound +, soft, non tender, no rebound or guarding, no palpable organomegaly  Extremities: No clubbing, cyanosis, edema.  Skin: No rash or suspicious skin lesions noted.  Neuro: A & O x 4. Normal speech and memory. Motor and sensory grossly normal.          Assessment and Plan    1. Chronic neck pain  2. Chronic right shoulder pain  - She will get MRI neck done in next week, ordered by physiatry.  She has been referred to shoulder specialist given loss of range of motion with adduction above shoulder level.  - Ordered DX-CHEST-2 VIEWS to rule out apical lung cancer although there is less suspicion since she is non-smoker and lack of other malignancy symptoms.   -Recommended ibuprofen 600 mg with food 3 times daily as needed for tension headaches and pain.  She is currently taking 400 mg without much effect.    3. Idiopathic peripheral neuropathy  This is most likely due to radiculopathy from neck pain.  Rule out other causes.  - TSH WITH REFLEX TO FT4; Future  - VITAMIN B12; Future  - VITAMIN D,25 HYDROXY; Future  - DX-CHEST-2 VIEWS; Future    4. PTSD " (post-traumatic stress disorder)  -Follow-up with psychiatry    5. Vegetarian  -She is currently on B12 supplement.  Check B12 level to make sure she is absorbing.    6. Well adult exam  - CBC WITH DIFFERENTIAL; Future  - Comp Metabolic Panel; Future  - Lipid Profile; Future    Patient was seen for 45 minutes face to face of which > 50% of appointment time was spent on counseling and coordination of care regarding the above.      Followup: Depends on lab and imaging results    Signed by: Amna Gregg M.D.

## 2020-05-13 ENCOUNTER — TELEPHONE (OUTPATIENT)
Dept: MEDICAL GROUP | Facility: MEDICAL CENTER | Age: 40
End: 2020-05-13

## 2020-05-13 NOTE — TELEPHONE ENCOUNTER
I have called back, left voice message to check my chart message to clarify which medication she is referring to.

## 2020-05-20 ENCOUNTER — TELEPHONE (OUTPATIENT)
Dept: MEDICAL GROUP | Facility: MEDICAL CENTER | Age: 40
End: 2020-05-20

## 2020-05-20 NOTE — TELEPHONE ENCOUNTER
Patient would like to recieve a call once you have recieved her MRI from [facility] located in [place] to get a 2nd opinion on her MRI.

## 2020-05-22 NOTE — TELEPHONE ENCOUNTER
Second call back. Went to . No other recommendations except for what physical therapy is doing already. No MRI findings that would require additional intervention at this point. Instructed to call back if she has more questions.

## 2024-11-27 ENCOUNTER — HOSPITAL ENCOUNTER (OUTPATIENT)
Dept: RADIOLOGY | Facility: MEDICAL CENTER | Age: 44
End: 2024-11-27